# Patient Record
Sex: FEMALE | Race: WHITE | NOT HISPANIC OR LATINO | Employment: OTHER | URBAN - METROPOLITAN AREA
[De-identification: names, ages, dates, MRNs, and addresses within clinical notes are randomized per-mention and may not be internally consistent; named-entity substitution may affect disease eponyms.]

---

## 2017-01-25 ENCOUNTER — RECORDS - HEALTHEAST (OUTPATIENT)
Dept: LAB | Facility: CLINIC | Age: 52
End: 2017-01-25

## 2017-01-25 LAB
CHOLEST SERPL-MCNC: 193 MG/DL
FASTING STATUS PATIENT QL REPORTED: NORMAL
HDLC SERPL-MCNC: 50 MG/DL
LDLC SERPL CALC-MCNC: 123 MG/DL
TRIGL SERPL-MCNC: 102 MG/DL

## 2018-02-21 ENCOUNTER — RECORDS - HEALTHEAST (OUTPATIENT)
Dept: LAB | Facility: CLINIC | Age: 53
End: 2018-02-21

## 2018-02-21 LAB
ALBUMIN SERPL-MCNC: 3.9 G/DL (ref 3.5–5)
ALP SERPL-CCNC: 81 U/L (ref 45–120)
ALT SERPL W P-5'-P-CCNC: 18 U/L (ref 0–45)
ANION GAP SERPL CALCULATED.3IONS-SCNC: 11 MMOL/L (ref 5–18)
AST SERPL W P-5'-P-CCNC: 15 U/L (ref 0–40)
BILIRUB SERPL-MCNC: 0.8 MG/DL (ref 0–1)
BUN SERPL-MCNC: 20 MG/DL (ref 8–22)
CALCIUM SERPL-MCNC: 9.3 MG/DL (ref 8.5–10.5)
CHLORIDE BLD-SCNC: 106 MMOL/L (ref 98–107)
CHOLEST SERPL-MCNC: 206 MG/DL
CO2 SERPL-SCNC: 23 MMOL/L (ref 22–31)
CREAT SERPL-MCNC: 0.77 MG/DL (ref 0.6–1.1)
FASTING STATUS PATIENT QL REPORTED: ABNORMAL
GFR SERPL CREATININE-BSD FRML MDRD: >60 ML/MIN/1.73M2
GLUCOSE BLD-MCNC: 102 MG/DL (ref 70–125)
HDLC SERPL-MCNC: 51 MG/DL
LDLC SERPL CALC-MCNC: 131 MG/DL
POTASSIUM BLD-SCNC: 4.1 MMOL/L (ref 3.5–5)
PROT SERPL-MCNC: 7.1 G/DL (ref 6–8)
SODIUM SERPL-SCNC: 140 MMOL/L (ref 136–145)
TRIGL SERPL-MCNC: 118 MG/DL

## 2018-02-28 ENCOUNTER — HOSPITAL ENCOUNTER (OUTPATIENT)
Dept: MAMMOGRAPHY | Facility: HOSPITAL | Age: 53
Discharge: HOME | End: 2018-02-28
Attending: FAMILY MEDICINE

## 2018-02-28 DIAGNOSIS — Z12.31 VISIT FOR SCREENING MAMMOGRAM: ICD-10-CM

## 2018-12-11 ENCOUNTER — RECORDS - HEALTHEAST (OUTPATIENT)
Dept: LAB | Facility: CLINIC | Age: 53
End: 2018-12-11

## 2018-12-11 LAB
ALBUMIN SERPL-MCNC: 4.2 G/DL (ref 3.5–5)
ALP SERPL-CCNC: 88 U/L (ref 45–120)
ALT SERPL W P-5'-P-CCNC: 20 U/L (ref 0–45)
ANION GAP SERPL CALCULATED.3IONS-SCNC: 12 MMOL/L (ref 5–18)
AST SERPL W P-5'-P-CCNC: 16 U/L (ref 0–40)
BILIRUB SERPL-MCNC: 0.6 MG/DL (ref 0–1)
BUN SERPL-MCNC: 19 MG/DL (ref 8–22)
CALCIUM SERPL-MCNC: 10.1 MG/DL (ref 8.5–10.5)
CHLORIDE BLD-SCNC: 105 MMOL/L (ref 98–107)
CHOLEST SERPL-MCNC: 204 MG/DL
CO2 SERPL-SCNC: 23 MMOL/L (ref 22–31)
CREAT SERPL-MCNC: 0.81 MG/DL (ref 0.6–1.1)
FASTING STATUS PATIENT QL REPORTED: ABNORMAL
GFR SERPL CREATININE-BSD FRML MDRD: >60 ML/MIN/1.73M2
GLUCOSE BLD-MCNC: 90 MG/DL (ref 70–125)
HDLC SERPL-MCNC: 54 MG/DL
LDLC SERPL CALC-MCNC: 128 MG/DL
POTASSIUM BLD-SCNC: 3.8 MMOL/L (ref 3.5–5)
PROT SERPL-MCNC: 7.2 G/DL (ref 6–8)
SODIUM SERPL-SCNC: 140 MMOL/L (ref 136–145)
TRIGL SERPL-MCNC: 111 MG/DL

## 2018-12-12 LAB
HCV AB SERPL QL IA: NEGATIVE
HPV SOURCE: NORMAL
HUMAN PAPILLOMA VIRUS 16 DNA: NEGATIVE
HUMAN PAPILLOMA VIRUS 18 DNA: NEGATIVE
HUMAN PAPILLOMA VIRUS FINAL DIAGNOSIS: NORMAL
HUMAN PAPILLOMA VIRUS OTHER HR: NEGATIVE
SPECIMEN DESCRIPTION: NORMAL

## 2018-12-19 LAB
BKR LAB AP ABNORMAL BLEEDING: NO
BKR LAB AP BIRTH CONTROL/HORMONES: NORMAL
BKR LAB AP CERVICAL APPEARANCE: NORMAL
BKR LAB AP GYN ADEQUACY: NORMAL
BKR LAB AP GYN INTERPRETATION: NORMAL
BKR LAB AP HPV REFLEX: NORMAL
BKR LAB AP LMP: NORMAL
BKR LAB AP PATIENT STATUS: NO
BKR LAB AP PREVIOUS ABNORMAL: NO
BKR LAB AP PREVIOUS NORMAL: 2013
HIGH RISK?: NO
PATH REPORT.COMMENTS IMP SPEC: NORMAL
RESULT FLAG (HE HISTORICAL CONVERSION): NORMAL

## 2019-12-04 ENCOUNTER — HOSPITAL ENCOUNTER (OUTPATIENT)
Dept: MAMMOGRAPHY | Facility: CLINIC | Age: 54
Discharge: HOME | End: 2019-12-04

## 2019-12-04 DIAGNOSIS — Z12.31 VISIT FOR SCREENING MAMMOGRAM: ICD-10-CM

## 2019-12-18 ENCOUNTER — RECORDS - HEALTHEAST (OUTPATIENT)
Dept: LAB | Facility: CLINIC | Age: 54
End: 2019-12-18

## 2019-12-18 LAB
ALBUMIN SERPL-MCNC: 4.1 G/DL (ref 3.5–5)
ALP SERPL-CCNC: 81 U/L (ref 45–120)
ALT SERPL W P-5'-P-CCNC: 17 U/L (ref 0–45)
ANION GAP SERPL CALCULATED.3IONS-SCNC: 8 MMOL/L (ref 5–18)
AST SERPL W P-5'-P-CCNC: 14 U/L (ref 0–40)
BILIRUB SERPL-MCNC: 0.9 MG/DL (ref 0–1)
BUN SERPL-MCNC: 18 MG/DL (ref 8–22)
CALCIUM SERPL-MCNC: 9.5 MG/DL (ref 8.5–10.5)
CHLORIDE BLD-SCNC: 106 MMOL/L (ref 98–107)
CHOLEST SERPL-MCNC: 206 MG/DL
CO2 SERPL-SCNC: 27 MMOL/L (ref 22–31)
CREAT SERPL-MCNC: 0.82 MG/DL (ref 0.6–1.1)
FASTING STATUS PATIENT QL REPORTED: ABNORMAL
GFR SERPL CREATININE-BSD FRML MDRD: >60 ML/MIN/1.73M2
GLUCOSE BLD-MCNC: 98 MG/DL (ref 70–125)
HDLC SERPL-MCNC: 53 MG/DL
LDLC SERPL CALC-MCNC: 133 MG/DL
POTASSIUM BLD-SCNC: 4.1 MMOL/L (ref 3.5–5)
PROT SERPL-MCNC: 7.2 G/DL (ref 6–8)
SODIUM SERPL-SCNC: 141 MMOL/L (ref 136–145)
TRIGL SERPL-MCNC: 102 MG/DL

## 2021-03-03 ENCOUNTER — RECORDS - HEALTHEAST (OUTPATIENT)
Dept: LAB | Facility: CLINIC | Age: 56
End: 2021-03-03

## 2021-03-03 LAB
ALBUMIN SERPL-MCNC: 3.9 G/DL (ref 3.5–5)
ALP SERPL-CCNC: 79 U/L (ref 45–120)
ALT SERPL W P-5'-P-CCNC: 18 U/L (ref 0–45)
ANION GAP SERPL CALCULATED.3IONS-SCNC: 9 MMOL/L (ref 5–18)
AST SERPL W P-5'-P-CCNC: 14 U/L (ref 0–40)
BILIRUB SERPL-MCNC: 0.9 MG/DL (ref 0–1)
BUN SERPL-MCNC: 18 MG/DL (ref 8–22)
CALCIUM SERPL-MCNC: 9 MG/DL (ref 8.5–10.5)
CHLORIDE BLD-SCNC: 108 MMOL/L (ref 98–107)
CHOLEST SERPL-MCNC: 200 MG/DL
CO2 SERPL-SCNC: 25 MMOL/L (ref 22–31)
CREAT SERPL-MCNC: 0.79 MG/DL (ref 0.6–1.1)
FASTING STATUS PATIENT QL REPORTED: ABNORMAL
GFR SERPL CREATININE-BSD FRML MDRD: >60 ML/MIN/1.73M2
GLUCOSE BLD-MCNC: 92 MG/DL (ref 70–125)
HDLC SERPL-MCNC: 54 MG/DL
LDLC SERPL CALC-MCNC: 123 MG/DL
POTASSIUM BLD-SCNC: 4.1 MMOL/L (ref 3.5–5)
PROT SERPL-MCNC: 6.8 G/DL (ref 6–8)
SODIUM SERPL-SCNC: 142 MMOL/L (ref 136–145)
TRIGL SERPL-MCNC: 117 MG/DL

## 2021-05-26 ENCOUNTER — RECORDS - HEALTHEAST (OUTPATIENT)
Dept: ADMINISTRATIVE | Facility: CLINIC | Age: 56
End: 2021-05-26

## 2021-05-28 ENCOUNTER — RECORDS - HEALTHEAST (OUTPATIENT)
Dept: ADMINISTRATIVE | Facility: CLINIC | Age: 56
End: 2021-05-28

## 2021-07-13 ENCOUNTER — RECORDS - HEALTHEAST (OUTPATIENT)
Dept: ADMINISTRATIVE | Facility: CLINIC | Age: 56
End: 2021-07-13

## 2021-07-21 ENCOUNTER — RECORDS - HEALTHEAST (OUTPATIENT)
Dept: ADMINISTRATIVE | Facility: CLINIC | Age: 56
End: 2021-07-21

## 2021-07-22 ENCOUNTER — RECORDS - HEALTHEAST (OUTPATIENT)
Dept: SCHEDULING | Facility: CLINIC | Age: 56
End: 2021-07-22

## 2021-07-22 DIAGNOSIS — Z12.31 OTHER SCREENING MAMMOGRAM: ICD-10-CM

## 2021-10-06 ENCOUNTER — ANCILLARY PROCEDURE (OUTPATIENT)
Dept: MAMMOGRAPHY | Facility: CLINIC | Age: 56
End: 2021-10-06
Attending: PHYSICIAN ASSISTANT
Payer: COMMERCIAL

## 2021-10-06 DIAGNOSIS — Z12.31 SCREENING MAMMOGRAM, ENCOUNTER FOR: ICD-10-CM

## 2021-10-06 PROCEDURE — 77067 SCR MAMMO BI INCL CAD: CPT

## 2022-06-29 ENCOUNTER — LAB REQUISITION (OUTPATIENT)
Dept: LAB | Facility: CLINIC | Age: 57
End: 2022-06-29
Payer: COMMERCIAL

## 2022-06-29 DIAGNOSIS — E78.2 MIXED HYPERLIPIDEMIA: ICD-10-CM

## 2022-06-29 DIAGNOSIS — I10 ESSENTIAL (PRIMARY) HYPERTENSION: ICD-10-CM

## 2022-06-29 LAB
ALBUMIN SERPL BCG-MCNC: 4.7 G/DL (ref 3.5–5.2)
ALP SERPL-CCNC: 84 U/L (ref 35–104)
ALT SERPL W P-5'-P-CCNC: 23 U/L (ref 10–35)
ANION GAP SERPL CALCULATED.3IONS-SCNC: 13 MMOL/L (ref 7–15)
AST SERPL W P-5'-P-CCNC: 18 U/L (ref 10–35)
BILIRUB SERPL-MCNC: 0.6 MG/DL
BUN SERPL-MCNC: 15.9 MG/DL (ref 8–23)
CALCIUM SERPL-MCNC: 9.7 MG/DL (ref 8.6–10)
CHLORIDE SERPL-SCNC: 104 MMOL/L (ref 98–107)
CHOLEST SERPL-MCNC: 257 MG/DL
CREAT SERPL-MCNC: 0.86 MG/DL (ref 0.51–0.95)
DEPRECATED HCO3 PLAS-SCNC: 24 MMOL/L (ref 22–29)
GFR SERPL CREATININE-BSD FRML MDRD: 77 ML/MIN/1.73M2
GLUCOSE SERPL-MCNC: 91 MG/DL (ref 70–99)
HDLC SERPL-MCNC: 54 MG/DL
LDLC SERPL CALC-MCNC: 170 MG/DL
NONHDLC SERPL-MCNC: 203 MG/DL
POTASSIUM SERPL-SCNC: 4.4 MMOL/L (ref 3.4–5.3)
PROT SERPL-MCNC: 7 G/DL (ref 6.4–8.3)
SODIUM SERPL-SCNC: 141 MMOL/L (ref 136–145)
TRIGL SERPL-MCNC: 167 MG/DL

## 2022-06-29 PROCEDURE — 80061 LIPID PANEL: CPT | Mod: ORL | Performed by: PHYSICIAN ASSISTANT

## 2022-06-29 PROCEDURE — 80053 COMPREHEN METABOLIC PANEL: CPT | Mod: ORL | Performed by: PHYSICIAN ASSISTANT

## 2022-10-26 ENCOUNTER — ANCILLARY PROCEDURE (OUTPATIENT)
Dept: MAMMOGRAPHY | Facility: CLINIC | Age: 57
End: 2022-10-26
Attending: PHYSICIAN ASSISTANT
Payer: COMMERCIAL

## 2022-10-26 DIAGNOSIS — Z12.31 VISIT FOR SCREENING MAMMOGRAM: ICD-10-CM

## 2022-10-26 PROCEDURE — 77067 SCR MAMMO BI INCL CAD: CPT

## 2023-07-24 ENCOUNTER — LAB REQUISITION (OUTPATIENT)
Dept: LAB | Facility: CLINIC | Age: 58
End: 2023-07-24
Payer: COMMERCIAL

## 2023-07-24 DIAGNOSIS — I10 ESSENTIAL (PRIMARY) HYPERTENSION: ICD-10-CM

## 2023-07-24 DIAGNOSIS — E78.2 MIXED HYPERLIPIDEMIA: ICD-10-CM

## 2023-07-24 DIAGNOSIS — Z12.4 ENCOUNTER FOR SCREENING FOR MALIGNANT NEOPLASM OF CERVIX: ICD-10-CM

## 2023-07-24 PROCEDURE — G0145 SCR C/V CYTO,THINLAYER,RESCR: HCPCS | Mod: ORL | Performed by: PHYSICIAN ASSISTANT

## 2023-07-24 PROCEDURE — 80061 LIPID PANEL: CPT | Mod: ORL | Performed by: PHYSICIAN ASSISTANT

## 2023-07-24 PROCEDURE — 80053 COMPREHEN METABOLIC PANEL: CPT | Mod: ORL | Performed by: PHYSICIAN ASSISTANT

## 2023-07-24 PROCEDURE — 87624 HPV HI-RISK TYP POOLED RSLT: CPT | Mod: ORL | Performed by: PHYSICIAN ASSISTANT

## 2023-07-25 LAB
ALBUMIN SERPL BCG-MCNC: 4.7 G/DL (ref 3.5–5.2)
ALP SERPL-CCNC: 81 U/L (ref 35–104)
ALT SERPL W P-5'-P-CCNC: 20 U/L (ref 0–50)
ANION GAP SERPL CALCULATED.3IONS-SCNC: 14 MMOL/L (ref 7–15)
AST SERPL W P-5'-P-CCNC: 21 U/L (ref 0–45)
BILIRUB SERPL-MCNC: 0.5 MG/DL
BUN SERPL-MCNC: 18.5 MG/DL (ref 8–23)
CALCIUM SERPL-MCNC: 9.4 MG/DL (ref 8.8–10.2)
CHLORIDE SERPL-SCNC: 106 MMOL/L (ref 98–107)
CHOLEST SERPL-MCNC: 222 MG/DL
CREAT SERPL-MCNC: 1.01 MG/DL (ref 0.51–0.95)
DEPRECATED HCO3 PLAS-SCNC: 23 MMOL/L (ref 22–29)
GFR SERPL CREATININE-BSD FRML MDRD: 63 ML/MIN/1.73M2
GLUCOSE SERPL-MCNC: 88 MG/DL (ref 70–99)
HDLC SERPL-MCNC: 56 MG/DL
LDLC SERPL CALC-MCNC: 143 MG/DL
NONHDLC SERPL-MCNC: 166 MG/DL
POTASSIUM SERPL-SCNC: 4.3 MMOL/L (ref 3.4–5.3)
PROT SERPL-MCNC: 7.1 G/DL (ref 6.4–8.3)
SODIUM SERPL-SCNC: 143 MMOL/L (ref 136–145)
TRIGL SERPL-MCNC: 117 MG/DL

## 2023-07-27 LAB
BKR LAB AP GYN ADEQUACY: NORMAL
BKR LAB AP GYN INTERPRETATION: NORMAL
BKR LAB AP HPV REFLEX: NORMAL
BKR LAB AP PREVIOUS ABNORMAL: NORMAL
PATH REPORT.COMMENTS IMP SPEC: NORMAL
PATH REPORT.COMMENTS IMP SPEC: NORMAL
PATH REPORT.RELEVANT HX SPEC: NORMAL

## 2023-07-31 ENCOUNTER — TRANSFERRED RECORDS (OUTPATIENT)
Dept: HEALTH INFORMATION MANAGEMENT | Facility: CLINIC | Age: 58
End: 2023-07-31

## 2023-07-31 LAB
HUMAN PAPILLOMA VIRUS 16 DNA: NEGATIVE
HUMAN PAPILLOMA VIRUS 18 DNA: NEGATIVE
HUMAN PAPILLOMA VIRUS FINAL DIAGNOSIS: NORMAL
HUMAN PAPILLOMA VIRUS OTHER HR: NEGATIVE

## 2023-08-08 ENCOUNTER — TRANSFERRED RECORDS (OUTPATIENT)
Dept: HEALTH INFORMATION MANAGEMENT | Facility: CLINIC | Age: 58
End: 2023-08-08
Payer: COMMERCIAL

## 2023-08-30 ENCOUNTER — MEDICAL CORRESPONDENCE (OUTPATIENT)
Dept: HEALTH INFORMATION MANAGEMENT | Facility: CLINIC | Age: 58
End: 2023-08-30
Payer: COMMERCIAL

## 2023-08-30 ENCOUNTER — TRANSCRIBE ORDERS (OUTPATIENT)
Dept: OTHER | Age: 58
End: 2023-08-30

## 2023-08-30 DIAGNOSIS — D17.23 LIPOMA OF RIGHT THIGH: Primary | ICD-10-CM

## 2023-08-31 ENCOUNTER — OFFICE VISIT (OUTPATIENT)
Dept: SURGERY | Facility: CLINIC | Age: 58
End: 2023-08-31
Attending: PHYSICIAN ASSISTANT
Payer: COMMERCIAL

## 2023-08-31 VITALS
DIASTOLIC BLOOD PRESSURE: 78 MMHG | HEIGHT: 64 IN | SYSTOLIC BLOOD PRESSURE: 128 MMHG | WEIGHT: 201.3 LBS | BODY MASS INDEX: 34.37 KG/M2

## 2023-08-31 DIAGNOSIS — D17.23 LIPOMA OF RIGHT THIGH: ICD-10-CM

## 2023-08-31 PROCEDURE — 99203 OFFICE O/P NEW LOW 30 MIN: CPT | Performed by: SURGERY

## 2023-08-31 RX ORDER — ASPIRIN 81 MG/1
81 TABLET ORAL DAILY
COMMUNITY

## 2023-08-31 RX ORDER — UBIDECARENONE 100 MG
100 CAPSULE ORAL DAILY
COMMUNITY

## 2023-08-31 RX ORDER — PRAVASTATIN SODIUM 10 MG
10 TABLET ORAL AT BEDTIME
COMMUNITY
Start: 2023-07-18

## 2023-08-31 RX ORDER — AMLODIPINE BESYLATE 10 MG/1
1 TABLET ORAL
COMMUNITY
Start: 2023-08-14

## 2023-08-31 RX ORDER — LISINOPRIL 20 MG/1
1 TABLET ORAL
COMMUNITY
Start: 2023-07-18

## 2023-08-31 NOTE — LETTER
8/31/2023         RE: Vinita De Los Santos  [address] [facility] [place] [address]        Dear Colleague,    Thank you for referring your patient, Vinita De Los Santos, to the [facility] [place]. Please see a copy of my visit note below.    History:   Vinita De Los Santos is a 58 year old female referred to general surgery by Raquel SCHAFFER for a lipoma.  She presents with her , Carrillo.  She has noticed a lump on her right thigh for well over a year.  It has slowly gotten larger over time.  It does not cause her any pain.  She does not think it causes any other associated symptoms, although her right second toe does go numb intermittently.  She has never developed any redness or drainage over the area of swelling.  For work-up she had an ultrasound and MRI.  They demonstrated a well-circumscribed fatty tumor consistent with a lipoma.     Allergies:  Latex    Past medical history:  HTN  Dyslipidemia   Obesity    Past surgical history:  Laparoscopy for ectopic pregnancy  Bilateral cataract extraction    Medications:     amLODIPine (NORVASC) 10 MG tablet, Take 1 tablet by mouth daily at 2 pm, Disp: , Rfl:      aspirin 81 MG EC tablet, Take 81 mg by mouth daily, Disp: , Rfl:      co-enzyme Q-10 100 MG CAPS capsule, Take 100 mg by mouth daily, Disp: , Rfl:      lisinopril (ZESTRIL) 20 MG tablet, Take 1 tablet by mouth daily at 2 pm, Disp: , Rfl:      pravastatin (PRAVACHOL) 10 MG tablet, Take 10 mg by mouth At Bedtime, Disp: , Rfl:     Family history:  Denies a family history of anesthesia problems    Social history:  Rarely consumes alcohol.  Denies tobacco and illicit drug use.    Review of Systems:   General: No complaints or constitutional symptoms  Skin: No skin problems  Hematologic/Lymphatic: No symptoms or complaints  Psychiatric: No symptoms or complaints  Endocrine: No excessive fatigue, no hypermetabolic symptoms reported  Respiratory: No cough,  "shortness of breath, or wheezing  Cardiovascular: No chest pain or dyspnea on exertion  Gastrointestinal: No acute abdominal pain, nausea, diarrhea or constipation  Musculoskeletal: No recent injuries reported  Neurological: No focal neurologic defects reported    Exam:  /78 (BP Location: Right arm, Patient Position: Sitting)   Ht 1.613 m (5' 3.5\")   Wt 91.3 kg (201 lb 4.8 oz)   BMI 35.10 kg/m    Body mass index is 35.1 kg/m .  General : Alert, cooperative, appears stated age   Skin: No concerning lesions  Lymphatic: No obvious adenopathy, no swelling   Eyes: No scleral icterus, pupils equal  HENT: No traumatic injury to the head or face, no gross abnormalities  Lungs: Normal respiratory effort, breath sounds equal bilaterally  Heart: Regular rate and rhythm  Abdomen: Soft, non-distended, non-tender to palpation  Musculoskeletal: Right distal external thigh has a clear fullness and is quite asymmetrical to the left thigh.  Palpably there is a 13 cm firm mass in this region.  This is palpably consistent with an intramuscular lipoma as opposed to a subcutaneous lipoma.  Neurologic: Grossly intact    Imaging:  Exam Date: 08/08/2023   EXAM: MR THIGH WITHOUT AND WITH CONTRAST UNILATERAL RIGHT  CLINICAL INFORMATION: Female, 58 years years old, with right thigh mass  INDICATION: Right thigh mass  PRIOR SURGERY: None reported.  PLAIN FILMS: None available.  COMPARISONS: No prior MRIs available.  TECHNICAL INFORMATION: Using a 1.2T MR scanner and a localizing surface coil:  sagittals: STIR, T1 precontrast, T1 postcontrast  coronals: T1, T2, STIR  axials: T1, PDFS, T1 FS precontrast, T1 FS postcontrast  SEDATION: None.  CONTRAST: 18 mL intravenous Dotarem contrast  FINDINGS:  Muscles and Tendons:  Within the distal vastus lateralis muscle belly, there is a mass measuring approximately 13.4 x 6.5 x 4.0 cm (coronal series 7 image 16, and axial series 10 image 27). The mass follows fat signal intensity on all sequences, " and demonstrates no evidence for thick septation, nodularity, or adjacent muscle or marrow signal abnormality. No evidence for postcontrast enhancement. The anterior compartment musculature and tendons - pectineus, sartorius, rectus femoris, vastus medialis, vastus intermedius- are otherwise intact.  The medial compartment musculature and tendons - gracilis, obturator externus, and adductors - are intact.  The posterior compartment musculature and tendons - biceps femoris, semitendinosus, semimembranosus- are intact. The gluteal muscles and tendons are intact.  Joints: The visualized portions of the hip is unremarkable. No expansive joint effusion is seen.  Bones: No acute bony abnormality is identified. There is a normal configuration of the bones. No focal bone marrow edema is identified. No suspicious osseous lesion or bone infarct is seen.  Soft Tissues: No soft tissue mass or focal fluid collection is identified. There is no lymphadenopathy. The visualized neurovascular structures are unremarkable.  CONCLUSION:  1. Mass within the distal vastus lateralis muscle most in keeping with benign intramuscular lipoma. No evidence for suspicious postcontrast enhancement, nodularity, or signal abnormality in the adjacent musculature or distal femur.  2. Otherwise unremarkable MRI of the femur.    My impression:  A clearly fatty tumor can be seen in the external distal aspect of the right thigh.  On MRI images it is intramuscular.    Assessment/Plan:   Vinita De Los Santos is a 58 year old female with a large intramuscular lipoma on her right thigh.  She would like to have this removed.  I discussed that I would feel most comfortable removing this lesion in the operating room under MAC anesthesia.  There would be a small risk of bleeding and infection.  We discussed the postoperative recovery.  They agree and would like to proceed with scheduling surgery.  Preoperative orders have been placed for excision of right thigh  intramuscular lipoma.  My surgery scheduler will work with her to pick a date.    Deedee West DO  General Surgeon  [facility]  Surgery [address]  [place] [address]  Office: [phone]  Employed by - [employer] [facility]        Again, thank you for allowing me to participate in the care of your patient.        Sincerely,        Deedee West DO

## 2023-08-31 NOTE — H&P (VIEW-ONLY)
History:   Vinita De Los Santos is a 58 year old female referred to general surgery by Raquel SCHAFFER for a lipoma.  She presents with her , Carrillo.  She has noticed a lump on her right thigh for well over a year.  It has slowly gotten larger over time.  It does not cause her any pain.  She does not think it causes any other associated symptoms, although her right second toe does go numb intermittently.  She has never developed any redness or drainage over the area of swelling.  For work-up she had an ultrasound and MRI.  They demonstrated a well-circumscribed fatty tumor consistent with a lipoma.     Allergies:  Latex    Past medical history:  HTN  Dyslipidemia   Obesity    Past surgical history:  Laparoscopy for ectopic pregnancy  Bilateral cataract extraction    Medications:    amLODIPine (NORVASC) 10 MG tablet, Take 1 tablet by mouth daily at 2 pm, Disp: , Rfl:     aspirin 81 MG EC tablet, Take 81 mg by mouth daily, Disp: , Rfl:     co-enzyme Q-10 100 MG CAPS capsule, Take 100 mg by mouth daily, Disp: , Rfl:     lisinopril (ZESTRIL) 20 MG tablet, Take 1 tablet by mouth daily at 2 pm, Disp: , Rfl:     pravastatin (PRAVACHOL) 10 MG tablet, Take 10 mg by mouth At Bedtime, Disp: , Rfl:     Family history:  Denies a family history of anesthesia problems    Social history:  Rarely consumes alcohol.  Denies tobacco and illicit drug use.    Review of Systems:   General: No complaints or constitutional symptoms  Skin: No skin problems  Hematologic/Lymphatic: No symptoms or complaints  Psychiatric: No symptoms or complaints  Endocrine: No excessive fatigue, no hypermetabolic symptoms reported  Respiratory: No cough, shortness of breath, or wheezing  Cardiovascular: No chest pain or dyspnea on exertion  Gastrointestinal: No acute abdominal pain, nausea, diarrhea or constipation  Musculoskeletal: No recent injuries reported  Neurological: No focal neurologic defects reported    Exam:  /78 (BP Location: Right arm,  "Patient Position: Sitting)   Ht 1.613 m (5' 3.5\")   Wt 91.3 kg (201 lb 4.8 oz)   BMI 35.10 kg/m    Body mass index is 35.1 kg/m .  General : Alert, cooperative, appears stated age   Skin: No concerning lesions  Lymphatic: No obvious adenopathy, no swelling   Eyes: No scleral icterus, pupils equal  HENT: No traumatic injury to the head or face, no gross abnormalities  Lungs: Normal respiratory effort, breath sounds equal bilaterally  Heart: Regular rate and rhythm  Abdomen: Soft, non-distended, non-tender to palpation  Musculoskeletal: Right distal external thigh has a clear fullness and is quite asymmetrical to the left thigh.  Palpably there is a 13 cm firm mass in this region.  This is palpably consistent with an intramuscular lipoma as opposed to a subcutaneous lipoma.  Neurologic: Grossly intact    Imaging:  Exam Date: 08/08/2023   EXAM: MR THIGH WITHOUT AND WITH CONTRAST UNILATERAL RIGHT  CLINICAL INFORMATION: Female, 58 years years old, with right thigh mass  INDICATION: Right thigh mass  PRIOR SURGERY: None reported.  PLAIN FILMS: None available.  COMPARISONS: No prior MRIs available.  TECHNICAL INFORMATION: Using a 1.2T MR scanner and a localizing surface coil:  sagittals: STIR, T1 precontrast, T1 postcontrast  coronals: T1, T2, STIR  axials: T1, PDFS, T1 FS precontrast, T1 FS postcontrast  SEDATION: None.  CONTRAST: 18 mL intravenous Dotarem contrast  FINDINGS:  Muscles and Tendons:  Within the distal vastus lateralis muscle belly, there is a mass measuring approximately 13.4 x 6.5 x 4.0 cm (coronal series 7 image 16, and axial series 10 image 27). The mass follows fat signal intensity on all sequences, and demonstrates no evidence for thick septation, nodularity, or adjacent muscle or marrow signal abnormality. No evidence for postcontrast enhancement. The anterior compartment musculature and tendons - pectineus, sartorius, rectus femoris, vastus medialis, vastus intermedius- are otherwise intact.  The " medial compartment musculature and tendons - gracilis, obturator externus, and adductors - are intact.  The posterior compartment musculature and tendons - biceps femoris, semitendinosus, semimembranosus- are intact. The gluteal muscles and tendons are intact.  Joints: The visualized portions of the hip is unremarkable. No expansive joint effusion is seen.  Bones: No acute bony abnormality is identified. There is a normal configuration of the bones. No focal bone marrow edema is identified. No suspicious osseous lesion or bone infarct is seen.  Soft Tissues: No soft tissue mass or focal fluid collection is identified. There is no lymphadenopathy. The visualized neurovascular structures are unremarkable.  CONCLUSION:  1. Mass within the distal vastus lateralis muscle most in keeping with benign intramuscular lipoma. No evidence for suspicious postcontrast enhancement, nodularity, or signal abnormality in the adjacent musculature or distal femur.  2. Otherwise unremarkable MRI of the femur.    My impression:  A clearly fatty tumor can be seen in the external distal aspect of the right thigh.  On MRI images it is intramuscular.    Assessment/Plan:   Vinita De Los Santos is a 58 year old female with a large intramuscular lipoma on her right thigh.  She would like to have this removed.  I discussed that I would feel most comfortable removing this lesion in the operating room under MAC anesthesia.  There would be a small risk of bleeding and infection.  We discussed the postoperative recovery.  They agree and would like to proceed with scheduling surgery.  Preoperative orders have been placed for excision of right thigh intramuscular lipoma.  My surgery scheduler will work with her to pick a date.    Deedee West DO  General Surgeon  [facility]  Surgery [facility] - [address] [address]  Office: [phone]  Employed by - [facility]

## 2023-08-31 NOTE — PROGRESS NOTES
History:   Vinita De Los Santos is a 58 year old female referred to general surgery by Raquel SCHAFFER for a lipoma.  She presents with her , Carrillo.  She has noticed a lump on her right thigh for well over a year.  It has slowly gotten larger over time.  It does not cause her any pain.  She does not think it causes any other associated symptoms, although her right second toe does go numb intermittently.  She has never developed any redness or drainage over the area of swelling.  For work-up she had an ultrasound and MRI.  They demonstrated a well-circumscribed fatty tumor consistent with a lipoma.     Allergies:  Latex    Past medical history:  HTN  Dyslipidemia   Obesity    Past surgical history:  Laparoscopy for ectopic pregnancy  Bilateral cataract extraction    Medications:    amLODIPine (NORVASC) 10 MG tablet, Take 1 tablet by mouth daily at 2 pm, Disp: , Rfl:     aspirin 81 MG EC tablet, Take 81 mg by mouth daily, Disp: , Rfl:     co-enzyme Q-10 100 MG CAPS capsule, Take 100 mg by mouth daily, Disp: , Rfl:     lisinopril (ZESTRIL) 20 MG tablet, Take 1 tablet by mouth daily at 2 pm, Disp: , Rfl:     pravastatin (PRAVACHOL) 10 MG tablet, Take 10 mg by mouth At Bedtime, Disp: , Rfl:     Family history:  Denies a family history of anesthesia problems    Social history:  Rarely consumes alcohol.  Denies tobacco and illicit drug use.    Review of Systems:   General: No complaints or constitutional symptoms  Skin: No skin problems  Hematologic/Lymphatic: No symptoms or complaints  Psychiatric: No symptoms or complaints  Endocrine: No excessive fatigue, no hypermetabolic symptoms reported  Respiratory: No cough, shortness of breath, or wheezing  Cardiovascular: No chest pain or dyspnea on exertion  Gastrointestinal: No acute abdominal pain, nausea, diarrhea or constipation  Musculoskeletal: No recent injuries reported  Neurological: No focal neurologic defects reported    Exam:  /78 (BP Location: Right arm,  "Patient Position: Sitting)   Ht 1.613 m (5' 3.5\")   Wt 91.3 kg (201 lb 4.8 oz)   BMI 35.10 kg/m    Body mass index is 35.1 kg/m .  General : Alert, cooperative, appears stated age   Skin: No concerning lesions  Lymphatic: No obvious adenopathy, no swelling   Eyes: No scleral icterus, pupils equal  HENT: No traumatic injury to the head or face, no gross abnormalities  Lungs: Normal respiratory effort, breath sounds equal bilaterally  Heart: Regular rate and rhythm  Abdomen: Soft, non-distended, non-tender to palpation  Musculoskeletal: Right distal external thigh has a clear fullness and is quite asymmetrical to the left thigh.  Palpably there is a 13 cm firm mass in this region.  This is palpably consistent with an intramuscular lipoma as opposed to a subcutaneous lipoma.  Neurologic: Grossly intact    Imaging:  Exam Date: 08/08/2023   EXAM: MR THIGH WITHOUT AND WITH CONTRAST UNILATERAL RIGHT  CLINICAL INFORMATION: Female, 58 years years old, with right thigh mass  INDICATION: Right thigh mass  PRIOR SURGERY: None reported.  PLAIN FILMS: None available.  COMPARISONS: No prior MRIs available.  TECHNICAL INFORMATION: Using a 1.2T MR scanner and a localizing surface coil:  sagittals: STIR, T1 precontrast, T1 postcontrast  coronals: T1, T2, STIR  axials: T1, PDFS, T1 FS precontrast, T1 FS postcontrast  SEDATION: None.  CONTRAST: 18 mL intravenous Dotarem contrast  FINDINGS:  Muscles and Tendons:  Within the distal vastus lateralis muscle belly, there is a mass measuring approximately 13.4 x 6.5 x 4.0 cm (coronal series 7 image 16, and axial series 10 image 27). The mass follows fat signal intensity on all sequences, and demonstrates no evidence for thick septation, nodularity, or adjacent muscle or marrow signal abnormality. No evidence for postcontrast enhancement. The anterior compartment musculature and tendons - pectineus, sartorius, rectus femoris, vastus medialis, vastus intermedius- are otherwise intact.  The " medial compartment musculature and tendons - gracilis, obturator externus, and adductors - are intact.  The posterior compartment musculature and tendons - biceps femoris, semitendinosus, semimembranosus- are intact. The gluteal muscles and tendons are intact.  Joints: The visualized portions of the hip is unremarkable. No expansive joint effusion is seen.  Bones: No acute bony abnormality is identified. There is a normal configuration of the bones. No focal bone marrow edema is identified. No suspicious osseous lesion or bone infarct is seen.  Soft Tissues: No soft tissue mass or focal fluid collection is identified. There is no lymphadenopathy. The visualized neurovascular structures are unremarkable.  CONCLUSION:  1. Mass within the distal vastus lateralis muscle most in keeping with benign intramuscular lipoma. No evidence for suspicious postcontrast enhancement, nodularity, or signal abnormality in the adjacent musculature or distal femur.  2. Otherwise unremarkable MRI of the femur.    My impression:  A clearly fatty tumor can be seen in the external distal aspect of the right thigh.  On MRI images it is intramuscular.    Assessment/Plan:   Vinita De Los Santos is a 58 year old female with a large intramuscular lipoma on her right thigh.  She would like to have this removed.  I discussed that I would feel most comfortable removing this lesion in the operating room under MAC anesthesia.  There would be a small risk of bleeding and infection.  We discussed the postoperative recovery.  They agree and would like to proceed with scheduling surgery.  Preoperative orders have been placed for excision of right thigh intramuscular lipoma.  My surgery scheduler will work with her to pick a date.    Deedee West DO  General Surgeon  [facility]  Surgery [facility] - [address] [address]  Office: [phone]  Employed by - [facility]

## 2023-09-05 ENCOUNTER — TELEPHONE (OUTPATIENT)
Dept: SURGERY | Facility: CLINIC | Age: 58
End: 2023-09-05
Payer: COMMERCIAL

## 2023-09-05 NOTE — LETTER
Thank you for choosing [facility] Surgery for your care. You are scheduled for surgery with Dr. West. Details are as follows:    Pre-op Physical: Call your primary care clinic, [facility], to schedule a pre-op physical.     Surgery Date: 09/21/2023     Location: [facility] [address] [place] [address]. Check in on 3rd floor; Left off the elevator    Approximate Arrival Time: 7:00 a.m. (Unless instructed differently by the pre-op call nurse)     Pre-Surgical Tasks:     Schedule a pre-op physical with your primary care doctor if not internal to [facility].  If internal, we have scheduled this.   The pre-op physical must be 10-30 days before surgery and since it is required by anesthesia, your surgery will be cancelled if it's not done.      Review all medications with your primary care or prescribing physician; they will advise you which meds to stop and when, and when you can resume taking.  Certain medications like blood thinners and weight loss medications need to be stopped in advance of surgery to proceed safely.      Blood thinners including but not exclusive to drugs like Xarelto, Eliquis, Warfarin and Aspirin, should be stopped five days before surgery, if your prescribing provider agrees. Follow your provider's advice on stopping blood thinners because they know you best.  If you are unsure if your medication is a blood thinner, ask your prescribing provider.    Weight loss medications: There are multiple medications being used for weight management and diabetes today, and the list is growing.  Phentermine, Ozempic, Wegovy, Trulicity, and other similar medications need to be stopped one week before surgery to avoid being cancelled.  Victoza and Saxenda can be continued longer but must be stopped one full day before surgery.  Please ask your prescribing provider for advice.    Diabetic medications: in addition to the medications talked about above  that are used for either weight loss or diabetes, some people are on insulin that may require adjustment.  Please discuss managing diabetic medications with your prescribing doctor as these medications may require modification prior to surgery.     Please shower the evening before and morning of surgery with Hibiclens soap.  This can be found at your local pharmacy.     Fasting instructions will be provided by the pre-op nurse who will call you 1-3 days before surgery.  Typically, we advise normal food up to 8 hours before you arrive for surgery. Clear liquids only from then until 2 hours before you arrive surgery, then nothing at all by mouth.  The nurse will review your specific instructions with you at the call.      Smoking impacts your body's ability to heal properly so we advise patients to quit if possible before surgery.  Plastic Surgery patients are required to be nicotine free for at least 8 weeks before surgery.      You will need an adult to drive you home and stay with you 24 hours after surgery. Public transportation or Medical Van Services are not permitted.    Visitor restrictions are subject to change, please verify with the pre-op nurse when they call how many people are permitted to accompany you.    We always encourage you to notify your insurance any time you have medical tests or procedures scheduled including surgery. The number is usually right on the back of your insurance card. To obtain pricing for surgery, please call [facility] Cost of Care at [phone] or email [email or web address].        Call our office if you have any questions! Thank you!

## 2023-09-05 NOTE — TELEPHONE ENCOUNTER
I talked with Vinita and scheduled her surgery with Dr. West for 09.21.23 at [facility]. We went over details and I let her know I will send a confirmation to e-mail, per her request. She's in agreement with the plan.

## 2023-09-07 ENCOUNTER — LAB REQUISITION (OUTPATIENT)
Dept: LAB | Facility: CLINIC | Age: 58
End: 2023-09-07
Payer: COMMERCIAL

## 2023-09-07 DIAGNOSIS — Z01.818 ENCOUNTER FOR OTHER PREPROCEDURAL EXAMINATION: ICD-10-CM

## 2023-09-07 LAB
ANION GAP SERPL CALCULATED.3IONS-SCNC: 13 MMOL/L (ref 7–15)
BUN SERPL-MCNC: 16.8 MG/DL (ref 8–23)
CALCIUM SERPL-MCNC: 9.5 MG/DL (ref 8.8–10.2)
CHLORIDE SERPL-SCNC: 105 MMOL/L (ref 98–107)
CREAT SERPL-MCNC: 0.94 MG/DL (ref 0.51–0.95)
DEPRECATED HCO3 PLAS-SCNC: 23 MMOL/L (ref 22–29)
EGFRCR SERPLBLD CKD-EPI 2021: 69 ML/MIN/1.73M2
ERYTHROCYTE [DISTWIDTH] IN BLOOD BY AUTOMATED COUNT: 13.6 % (ref 10–15)
GLUCOSE SERPL-MCNC: 104 MG/DL (ref 70–99)
HCT VFR BLD AUTO: 45.4 % (ref 35–47)
HGB BLD-MCNC: 14.8 G/DL (ref 11.7–15.7)
MCH RBC QN AUTO: 29.9 PG (ref 26.5–33)
MCHC RBC AUTO-ENTMCNC: 32.6 G/DL (ref 31.5–36.5)
MCV RBC AUTO: 92 FL (ref 78–100)
PLATELET # BLD AUTO: 345 10E3/UL (ref 150–450)
POTASSIUM SERPL-SCNC: 4.2 MMOL/L (ref 3.4–5.3)
RBC # BLD AUTO: 4.95 10E6/UL (ref 3.8–5.2)
SODIUM SERPL-SCNC: 141 MMOL/L (ref 136–145)
WBC # BLD AUTO: 6.6 10E3/UL (ref 4–11)

## 2023-09-07 PROCEDURE — 85027 COMPLETE CBC AUTOMATED: CPT | Mod: ORL | Performed by: PHYSICIAN ASSISTANT

## 2023-09-07 PROCEDURE — 80048 BASIC METABOLIC PNL TOTAL CA: CPT | Mod: ORL | Performed by: PHYSICIAN ASSISTANT

## 2023-09-20 ENCOUNTER — ANESTHESIA EVENT (OUTPATIENT)
Dept: SURGERY | Facility: AMBULATORY SURGERY CENTER | Age: 58
End: 2023-09-20
Payer: COMMERCIAL

## 2023-09-21 ENCOUNTER — HOSPITAL ENCOUNTER (OUTPATIENT)
Facility: AMBULATORY SURGERY CENTER | Age: 58
Discharge: HOME | End: 2023-09-21
Attending: SURGERY
Payer: COMMERCIAL

## 2023-09-21 ENCOUNTER — ANESTHESIA (OUTPATIENT)
Dept: SURGERY | Facility: AMBULATORY SURGERY CENTER | Age: 58
End: 2023-09-21
Payer: COMMERCIAL

## 2023-09-21 VITALS
BODY MASS INDEX: 34.31 KG/M2 | SYSTOLIC BLOOD PRESSURE: 134 MMHG | OXYGEN SATURATION: 96 % | WEIGHT: 201 LBS | HEART RATE: 70 BPM | RESPIRATION RATE: 16 BRPM | HEIGHT: 64 IN | TEMPERATURE: 96.4 F | DIASTOLIC BLOOD PRESSURE: 78 MMHG

## 2023-09-21 DIAGNOSIS — D17.23 LIPOMA OF RIGHT THIGH: ICD-10-CM

## 2023-09-21 DIAGNOSIS — G89.18 POSTOPERATIVE PAIN: Primary | ICD-10-CM

## 2023-09-21 PROCEDURE — 27337 EXC THIGH/KNEE LES SC 3 CM/>: CPT | Mod: RT | Performed by: SURGERY

## 2023-09-21 RX ORDER — TRAMADOL HYDROCHLORIDE 50 MG/1
50 TABLET ORAL EVERY 6 HOURS PRN
Qty: 8 TABLET | Refills: 0 | Status: SHIPPED | OUTPATIENT
Start: 2023-09-21 | End: 2023-09-24

## 2023-09-21 RX ORDER — HYDROMORPHONE HCL IN WATER/PF 6 MG/30 ML
0.2 PATIENT CONTROLLED ANALGESIA SYRINGE INTRAVENOUS EVERY 5 MIN PRN
Status: DISCONTINUED | OUTPATIENT
Start: 2023-09-21 | End: 2023-09-22 | Stop reason: HOSPADM

## 2023-09-21 RX ORDER — LIDOCAINE HYDROCHLORIDE 20 MG/ML
INJECTION, SOLUTION INFILTRATION; PERINEURAL PRN
Status: DISCONTINUED | OUTPATIENT
Start: 2023-09-21 | End: 2023-09-21

## 2023-09-21 RX ORDER — ONDANSETRON 2 MG/ML
INJECTION INTRAMUSCULAR; INTRAVENOUS PRN
Status: DISCONTINUED | OUTPATIENT
Start: 2023-09-21 | End: 2023-09-21

## 2023-09-21 RX ORDER — HYDROMORPHONE HCL IN WATER/PF 6 MG/30 ML
0.4 PATIENT CONTROLLED ANALGESIA SYRINGE INTRAVENOUS EVERY 5 MIN PRN
Status: DISCONTINUED | OUTPATIENT
Start: 2023-09-21 | End: 2023-09-22 | Stop reason: HOSPADM

## 2023-09-21 RX ORDER — PROPOFOL 10 MG/ML
INJECTION, EMULSION INTRAVENOUS CONTINUOUS PRN
Status: DISCONTINUED | OUTPATIENT
Start: 2023-09-21 | End: 2023-09-21

## 2023-09-21 RX ORDER — OXYCODONE HYDROCHLORIDE 10 MG/1
10 TABLET ORAL
Status: DISCONTINUED | OUTPATIENT
Start: 2023-09-21 | End: 2023-09-22 | Stop reason: HOSPADM

## 2023-09-21 RX ORDER — FENTANYL CITRATE 0.05 MG/ML
25 INJECTION, SOLUTION INTRAMUSCULAR; INTRAVENOUS EVERY 5 MIN PRN
Status: DISCONTINUED | OUTPATIENT
Start: 2023-09-21 | End: 2023-09-22 | Stop reason: HOSPADM

## 2023-09-21 RX ORDER — ONDANSETRON 4 MG/1
4 TABLET, ORALLY DISINTEGRATING ORAL EVERY 30 MIN PRN
Status: DISCONTINUED | OUTPATIENT
Start: 2023-09-21 | End: 2023-09-22 | Stop reason: HOSPADM

## 2023-09-21 RX ORDER — SODIUM CHLORIDE, SODIUM LACTATE, POTASSIUM CHLORIDE, CALCIUM CHLORIDE 600; 310; 30; 20 MG/100ML; MG/100ML; MG/100ML; MG/100ML
INJECTION, SOLUTION INTRAVENOUS CONTINUOUS
Status: DISCONTINUED | OUTPATIENT
Start: 2023-09-21 | End: 2023-09-22 | Stop reason: HOSPADM

## 2023-09-21 RX ORDER — ONDANSETRON 2 MG/ML
4 INJECTION INTRAMUSCULAR; INTRAVENOUS EVERY 30 MIN PRN
Status: DISCONTINUED | OUTPATIENT
Start: 2023-09-21 | End: 2023-09-22 | Stop reason: HOSPADM

## 2023-09-21 RX ORDER — LIDOCAINE 40 MG/G
CREAM TOPICAL
Status: DISCONTINUED | OUTPATIENT
Start: 2023-09-21 | End: 2023-09-22 | Stop reason: HOSPADM

## 2023-09-21 RX ORDER — FENTANYL CITRATE 0.05 MG/ML
50 INJECTION, SOLUTION INTRAMUSCULAR; INTRAVENOUS EVERY 5 MIN PRN
Status: DISCONTINUED | OUTPATIENT
Start: 2023-09-21 | End: 2023-09-22 | Stop reason: HOSPADM

## 2023-09-21 RX ORDER — DEXAMETHASONE SODIUM PHOSPHATE 4 MG/ML
INJECTION, SOLUTION INTRA-ARTICULAR; INTRALESIONAL; INTRAMUSCULAR; INTRAVENOUS; SOFT TISSUE PRN
Status: DISCONTINUED | OUTPATIENT
Start: 2023-09-21 | End: 2023-09-21

## 2023-09-21 RX ORDER — FENTANYL CITRATE 50 UG/ML
INJECTION, SOLUTION INTRAMUSCULAR; INTRAVENOUS PRN
Status: DISCONTINUED | OUTPATIENT
Start: 2023-09-21 | End: 2023-09-21

## 2023-09-21 RX ORDER — OXYCODONE HYDROCHLORIDE 5 MG/1
5 TABLET ORAL
Status: DISCONTINUED | OUTPATIENT
Start: 2023-09-21 | End: 2023-09-22 | Stop reason: HOSPADM

## 2023-09-21 RX ORDER — KETOROLAC TROMETHAMINE 30 MG/ML
INJECTION, SOLUTION INTRAMUSCULAR; INTRAVENOUS PRN
Status: DISCONTINUED | OUTPATIENT
Start: 2023-09-21 | End: 2023-09-21

## 2023-09-21 RX ADMIN — FENTANYL CITRATE 50 MCG: 50 INJECTION, SOLUTION INTRAMUSCULAR; INTRAVENOUS at 11:24

## 2023-09-21 RX ADMIN — PROPOFOL 150 MCG/KG/MIN: 10 INJECTION, EMULSION INTRAVENOUS at 11:24

## 2023-09-21 RX ADMIN — KETOROLAC TROMETHAMINE 15 MG: 30 INJECTION, SOLUTION INTRAMUSCULAR; INTRAVENOUS at 12:05

## 2023-09-21 RX ADMIN — DEXAMETHASONE SODIUM PHOSPHATE 10 MG: 4 INJECTION, SOLUTION INTRA-ARTICULAR; INTRALESIONAL; INTRAMUSCULAR; INTRAVENOUS; SOFT TISSUE at 11:29

## 2023-09-21 RX ADMIN — LIDOCAINE HYDROCHLORIDE 60 MG: 20 INJECTION, SOLUTION INFILTRATION; PERINEURAL at 11:23

## 2023-09-21 RX ADMIN — ONDANSETRON 4 MG: 2 INJECTION INTRAMUSCULAR; INTRAVENOUS at 11:29

## 2023-09-21 RX ADMIN — FENTANYL CITRATE 50 MCG: 50 INJECTION, SOLUTION INTRAMUSCULAR; INTRAVENOUS at 11:33

## 2023-09-21 RX ADMIN — SODIUM CHLORIDE, SODIUM LACTATE, POTASSIUM CHLORIDE, CALCIUM CHLORIDE: 600; 310; 30; 20 INJECTION, SOLUTION INTRAVENOUS at 10:24

## 2023-09-21 NOTE — ANESTHESIA PREPROCEDURE EVALUATION
Anesthesia Pre-Procedure Evaluation    Patient: Vinita De Los Santos   MRN: [id number] : 1965        Procedure : Procedure(s):  EXCISION INTRAMUSCULAR THIGH LIPOMA          Past Medical History:   Diagnosis Date    Hypertension       History reviewed. No pertinent surgical history.   Allergies   Allergen Reactions    Latex      Other Reaction(s): bumps on skin, redness, itching      Social History     Tobacco Use    Smoking status: Never    Smokeless tobacco: Never   Substance Use Topics    Alcohol use: Yes     Comment: occassional      Wt Readings from Last 1 Encounters:   23 91.3 kg (201 lb 4.8 oz)        Anesthesia Evaluation            ROS/MED HX  ENT/Pulmonary:  - neg pulmonary ROS     Neurologic:  - neg neurologic ROS     Cardiovascular:     (+)  hypertension- -   -  - -                                      METS/Exercise Tolerance:     Hematologic:  - neg hematologic  ROS     Musculoskeletal: Comment: Lipoma in right thigh      GI/Hepatic:  - neg GI/hepatic ROS     Renal/Genitourinary:  - neg Renal ROS     Endo:  - neg endo ROS     Psychiatric/Substance Use:  - neg psychiatric ROS     Infectious Disease:       Malignancy:       Other:            Physical Exam    Airway  airway exam normal      Mallampati: II   TM distance: > 3 FB   Neck ROM: full   Mouth opening: > 3 cm    Respiratory Devices and Support         Dental       (+) Minor Abnormalities - some fillings, tiny chips      Cardiovascular   cardiovascular exam normal          Pulmonary   pulmonary exam normal                OUTSIDE LABS:  CBC:   Lab Results   Component Value Date    WBC 6.6 2023    HGB 14.8 2023    HCT 45.4 2023     2023     BMP:   Lab Results   Component Value Date     2023     2023    POTASSIUM 4.2 2023    POTASSIUM 4.3 2023    CHLORIDE 105 2023    CHLORIDE 106 2023    CO2 23 2023    CO2 23 2023    BUN 16.8 2023    BUN 18.5  07/24/2023    CR 0.94 09/07/2023    CR 1.01 (H) 07/24/2023     (H) 09/07/2023    GLC 88 07/24/2023     COAGS: No results found for: PTT, INR, FIBR  POC: No results found for: BGM, HCG, HCGS  HEPATIC:   Lab Results   Component Value Date    ALBUMIN 4.7 07/24/2023    PROTTOTAL 7.1 07/24/2023    ALT 20 07/24/2023    AST 21 07/24/2023    ALKPHOS 81 07/24/2023    BILITOTAL 0.5 07/24/2023     OTHER:   Lab Results   Component Value Date    RAIN 9.5 09/07/2023       Anesthesia Plan    ASA Status:  1       Anesthesia Type: MAC.     - Reason for MAC: immobility needed, straight local not clinically adequate              Consents    Anesthesia Plan(s) and associated risks, benefits, and realistic alternatives discussed. Questions answered and patient/representative(s) expressed understanding.     - Discussed:     - Discussed with:  Patient      - Extended Intubation/Ventilatory Support Discussed: No.      - Patient is DNR/DNI Status: No          Postoperative Care    Pain management: Multi-modal analgesia.   PONV prophylaxis: Ondansetron (or other 5HT-3)     Comments:                Katelyn Cleveland MD

## 2023-09-21 NOTE — ANESTHESIA POSTPROCEDURE EVALUATION
Patient: Vinita De Los Santos    Procedure: Procedure(s):  EXCISION INTRAMUSCULAR THIGH LIPOMA       Anesthesia Type:  MAC    Note:  Disposition: Outpatient   Postop Pain Control: Uneventful            Sign Out: Well controlled pain   PONV: No   Neuro/Psych: Uneventful            Sign Out: Acceptable/Baseline neuro status   Airway/Respiratory: Uneventful            Sign Out: Acceptable/Baseline resp. status   CV/Hemodynamics: Uneventful            Sign Out: Acceptable CV status; No obvious hypovolemia; No obvious fluid overload   Other NRE: NONE   DID A NON-ROUTINE EVENT OCCUR?            Last vitals:  Vitals Value Taken Time   /78 09/21/23 1230   Temp 96.4  F (35.8  C) 09/21/23 1212   Pulse 72 09/21/23 1243   Resp 16 09/21/23 1230   SpO2 96 % 09/21/23 1243   Vitals shown include unvalidated device data.    Electronically Signed By: Katelyn Cleveland MD  September 21, 2023  1:01 PM

## 2023-09-21 NOTE — DISCHARGE INSTRUCTIONS
You received a dose of IV Toradol at 12 PM. Please do not take any additional Ibuprofen/NSAID products (Aleve/Advil/Motrin/Naproxen/Celebrex) until after 6 PM.  For 24 hours after surgery    Get plenty of rest.  A responsible adult must stay with you for at least 24 hours after you leave the hospital.   Do not drive or use heavy equipment.  If you have weakness or tingling, don't drive or use heavy equipment until this feeling goes away.  Do not drink alcohol.  Avoid strenuous or risky activities.  Ask for help when climbing stairs.   You may feel lightheaded.  IF so, sit for a few minutes before standing.  Have someone help you get up.   If you have nausea (feel sick to your stomach): Drink only clear liquids such as apple juice, ginger ale, broth or 7-Up.  Rest may also help.  Be sure to drink enough fluids.  Move to a regular diet as you feel able.  You may have a slight fever. Call the doctor if your fever is over 100 F (37.7 C) (taken under the tongue) or lasts longer than 24 hours.  You may have a dry mouth, a sore throat, muscle aches or trouble sleeping.  These should go away after 24 hours.  Do not make important or legal decisions.   Call your doctor for any of the followin.  Signs of infection (fever, growing tenderness at the surgery site, a large amount of drainage or bleeding, severe pain, foul-smelling drainage, redness, swelling).    2. It has been over 8 to 10 hours since surgery and you are still not able to urinate (pass water).    3.  Headache for over 24 hours.    4.  Numbness, tingling or weakness the day after surgery (if you had spinal anesthesia).  If you have any questions or concerns regarding your procedure, please contact Dr. Deedee West, her office number is [phone].

## 2023-09-21 NOTE — OP NOTE
Name:  Vinita De Los Santos  PCP:  Raquel Walton  Procedure Date:  9/21/2023      EXCISION OF INTRAMUSCULAR RIGHT THIGH LIPOMA       Pre-Procedure Diagnosis:  Lipoma of right thigh    Post-Procedure Diagnosis:    Lipoma of right thigh    Surgeon:  Deedee West DO    Anesthesia Type:    MAC    Estimated Blood Loss:   2 mL    Specimens:    Lipoma    Complications:    None apparent    Indication for procedure:  This is a 58-year-old female who noticed a swelling at the distal aspect of her right thigh over a year ago.  It has slowly gotten larger over time.  It has not really caused her much symptoms.  Diagnostic work-up included ultrasound and MRI.  She was found to have a well-circumscribed fatty tumor within her muscle.    Operative Report:    After informed consent was obtained, and the risks and benefits of the procedure were discussed, the patient was brought back to the operative suite and placed in the supine position.  MAC anesthesia was provided by the anesthesia department.  The right distal thigh and proximal lower leg was prepped and draped in the usual sterile fashion.  Local anesthetic was administered prior to incision.  A longitudinal incision was made over the apex of the palpable mass located in the distal outer aspect of the upper thigh.  Electrocautery was utilized to dissect through the subcutaneous adipose down to the fascia.  The fascia was incised and a fatty tumor started to pop out of the cavity.  Through finger fracture, the lipoma was freed circumferentially and then removed in pieces from within the muscle.  The lipoma measured approximately 13 x 11 cm.  Hemostasis was assured within the wound.  The fascia was closed with running 3-0 Vicryl followed by interrupted 3-0 Vicryl for the subcutaneous tissue, interrupted 4-0 Vicryl for the deep dermis and a running subcuticular 4-0 Monocryl.  The incision was then covered with Dermabond.    Disposition:  The patient tolerated the procedure well,  and was transferred to the postprocedural care unit in stable condition.      Deedee West DO  General Surgeon  [facility]  Surgery [address]  [address] [place] [id number]  Office: [phone]  Employed by - [facility]

## 2023-09-21 NOTE — INTERVAL H&P NOTE
Patient seen in pre-op with her .  Denies new medical history since she was last seen.  All questions answered regarding procedure.  Consent obtained.  To the OR for excision of a right thigh intramuscular lipoma.    Deedee West DO  General Surgeon  [facility]  Surgery [facility] - [address] [address]  Office: [phone]  Employed by - [facility]

## 2023-09-21 NOTE — ANESTHESIA CARE TRANSFER NOTE
Patient: Vinita De Los Santos    Procedure: Procedure(s):  EXCISION INTRAMUSCULAR THIGH LIPOMA       Diagnosis: Lipoma of right thigh [D17.23]  Diagnosis Additional Information: No value filed.    Anesthesia Type:   MAC     Note:    Oropharynx: oropharynx clear of all foreign objects and spontaneously breathing  Level of Consciousness: drowsy  Oxygen Supplementation: room air    Independent Airway: airway patency satisfactory and stable  Dentition: dentition unchanged  Vital Signs Stable: post-procedure vital signs reviewed and stable  Report to RN Given: handoff report given  Patient transferred to: Phase II    Handoff Report: Identifed the Patient, Identified the Reponsible Provider, Reviewed the pertinent medical history, Discussed the surgical course, Reviewed Intra-OP anesthesia mangement and issues during anesthesia, Set expectations for post-procedure period and Allowed opportunity for questions and acknowledgement of understanding      Vitals:  Vitals Value Taken Time   /67 09/21/23 1212   Temp 35.8  C (96.4  F) 09/21/23 1212   Pulse 80 09/21/23 1212   Resp 16 09/21/23 1212   SpO2 94 % 09/21/23 1212       Electronically Signed By: ALFREDO Melendez CRNA  September 21, 2023  12:16 PM

## 2023-10-14 ENCOUNTER — HEALTH MAINTENANCE LETTER (OUTPATIENT)
Age: 58
End: 2023-10-14

## 2023-10-27 ENCOUNTER — ANCILLARY PROCEDURE (OUTPATIENT)
Dept: MAMMOGRAPHY | Facility: CLINIC | Age: 58
End: 2023-10-27
Attending: PHYSICIAN ASSISTANT
Payer: COMMERCIAL

## 2023-10-27 DIAGNOSIS — Z12.31 VISIT FOR SCREENING MAMMOGRAM: ICD-10-CM

## 2023-10-27 PROCEDURE — 77067 SCR MAMMO BI INCL CAD: CPT

## 2024-08-28 ENCOUNTER — LAB REQUISITION (OUTPATIENT)
Dept: LAB | Facility: CLINIC | Age: 59
End: 2024-08-28
Payer: COMMERCIAL

## 2024-08-28 DIAGNOSIS — I10 ESSENTIAL (PRIMARY) HYPERTENSION: ICD-10-CM

## 2024-08-28 DIAGNOSIS — E78.2 MIXED HYPERLIPIDEMIA: ICD-10-CM

## 2024-08-28 LAB
ALBUMIN SERPL BCG-MCNC: 4.5 G/DL (ref 3.5–5.2)
ALP SERPL-CCNC: 79 U/L (ref 40–150)
ALT SERPL W P-5'-P-CCNC: 21 U/L (ref 0–50)
ANION GAP SERPL CALCULATED.3IONS-SCNC: 13 MMOL/L (ref 7–15)
AST SERPL W P-5'-P-CCNC: 16 U/L (ref 0–45)
BILIRUB SERPL-MCNC: 0.7 MG/DL
BUN SERPL-MCNC: 18.3 MG/DL (ref 8–23)
CALCIUM SERPL-MCNC: 9.3 MG/DL (ref 8.8–10.4)
CHLORIDE SERPL-SCNC: 106 MMOL/L (ref 98–107)
CHOLEST SERPL-MCNC: 202 MG/DL
CREAT SERPL-MCNC: 0.91 MG/DL (ref 0.51–0.95)
EGFRCR SERPLBLD CKD-EPI 2021: 71 ML/MIN/1.73M2
FASTING STATUS PATIENT QL REPORTED: ABNORMAL
FASTING STATUS PATIENT QL REPORTED: NORMAL
GLUCOSE SERPL-MCNC: 94 MG/DL (ref 70–99)
HCO3 SERPL-SCNC: 22 MMOL/L (ref 22–29)
HDLC SERPL-MCNC: 52 MG/DL
LDLC SERPL CALC-MCNC: 127 MG/DL
NONHDLC SERPL-MCNC: 150 MG/DL
POTASSIUM SERPL-SCNC: 3.9 MMOL/L (ref 3.4–5.3)
PROT SERPL-MCNC: 7.5 G/DL (ref 6.4–8.3)
SODIUM SERPL-SCNC: 141 MMOL/L (ref 135–145)
TRIGL SERPL-MCNC: 115 MG/DL

## 2024-08-28 PROCEDURE — 80061 LIPID PANEL: CPT | Mod: ORL | Performed by: PHYSICIAN ASSISTANT

## 2024-08-28 PROCEDURE — 80053 COMPREHEN METABOLIC PANEL: CPT | Mod: ORL | Performed by: PHYSICIAN ASSISTANT

## 2024-10-28 ENCOUNTER — ANCILLARY PROCEDURE (OUTPATIENT)
Dept: MAMMOGRAPHY | Facility: CLINIC | Age: 59
End: 2024-10-28
Attending: PHYSICIAN ASSISTANT
Payer: COMMERCIAL

## 2024-10-28 DIAGNOSIS — Z12.31 VISIT FOR SCREENING MAMMOGRAM: ICD-10-CM

## 2024-10-28 PROCEDURE — 77063 BREAST TOMOSYNTHESIS BI: CPT

## 2024-12-07 ENCOUNTER — HEALTH MAINTENANCE LETTER (OUTPATIENT)
Age: 59
End: 2024-12-07